# Patient Record
Sex: MALE | Race: WHITE | URBAN - METROPOLITAN AREA
[De-identification: names, ages, dates, MRNs, and addresses within clinical notes are randomized per-mention and may not be internally consistent; named-entity substitution may affect disease eponyms.]

---

## 2017-01-11 NOTE — PROCEDURE NOTE: CLINICAL
PROCEDURE NOTE: Eylea 2mg OD. Diagnosis: Neovascular AMD with Active CNV. Anesthesia: Akten Gel 3.5%. Prep: Betadine Drops. Prior to injection, risks/benefits/alternatives discussed including infection, loss of vision, hemorrhage, cataract, glaucoma, retinal tears or detachment. The patient wished to proceed with treatment. Betadine prep was performed. Topical anesthesia was induced with Alcaine. Additional anesthesia was achieved using drop(s) or injection checked above. A drop of Povidone-iodine 5% ophthalmic solution was instilled over the injection site and in the inferior fornix. Using the syringe provided, Eylea 2.0mg in 0.05 cc was injected into the vitreous cavity. The needle was passed 3.0 mm posterior to the limbus in pseudophakic patients, and 3.5 mm posterior to the limbus in phakic patients. Patient tolerated procedure well. There were no complications. Injection time: *. The eye was irrigated with sterile irrigating solution. Post procedure instructions given. The patient was instructed to return for re-evaluation in approximately 4-12 weeks depending on his/her condition and was told to call immediately if vision decreases and/or if his/her eye becomes red, painful, and/or light sensitive. The patient was instructed to go to the emergency room or call 911 if unable to reach the doctor within an hour or two of trying or calling. The patient was instructed to use Artificial Tears q.i.d. p.r.n for comfort. Ishan Cooper

## 2017-01-11 NOTE — PATIENT DISCUSSION
Based on patient's symptoms, exam, diagnostic testing and records the following are my findings and recommendations.

## 2017-01-11 NOTE — PATIENT DISCUSSION
EYLEA AND REPEAT 6 WKS - MILD TO MODERATE EDEMA AT 8 WKS, HAS BEEN DOING WELL WITH DR Omar Gregg IN [facility] AT 6 WKS

## 2017-03-01 NOTE — PROCEDURE NOTE: CLINICAL
PROCEDURE NOTE: Eylea 2mg OD. Diagnosis: Neovascular AMD with Active CNV. Anesthesia: Akten Gel 3.5%. Prep: Betadine Drops. Prior to injection, risks/benefits/alternatives discussed including infection, loss of vision, hemorrhage, cataract, glaucoma, retinal tears or detachment. The patient wished to proceed with treatment. Betadine prep was performed. Topical anesthesia was induced with Alcaine. Additional anesthesia was achieved using drop(s) or injection checked above. A drop of Povidone-iodine 5% ophthalmic solution was instilled over the injection site and in the inferior fornix. Using the syringe provided, Eylea 2.0mg in 0.05 cc was injected into the vitreous cavity. The needle was passed 3.0 mm posterior to the limbus in pseudophakic patients, and 3.5 mm posterior to the limbus in phakic patients. Patient tolerated procedure well. There were no complications. Injection time: 12:10 PM. The eye was irrigated with sterile irrigating solution. Post procedure instructions given. The patient was instructed to return for re-evaluation in approximately 4-12 weeks depending on his/her condition and was told to call immediately if vision decreases and/or if his/her eye becomes red, painful, and/or light sensitive. The patient was instructed to go to the emergency room or call 911 if unable to reach the doctor within an hour or two of trying or calling. The patient was instructed to use Artificial Tears q.i.d. p.r.n for comfort. Duane Rivera

## 2017-03-01 NOTE — PATIENT DISCUSSION
EYLEA AND REPEAT 6 WKS - MILD TO MODERATE EDEMA AT 8 WKS, HAS BEEN DOING WELL WITH DR Trevor Gresham IN [facility] AT 6 WKS

## 2018-03-01 NOTE — PATIENT DISCUSSION
MARCELO AND RE-EVAL DR Selvin Gallo IN 8 WKS  - TRACE EDEMA AT 8 WKS - HAS BEEN DOING WELL WITH DR Gricelda Edmonds IN [place] AT 6-8 WKS.

## 2018-03-01 NOTE — PROCEDURE NOTE: CLINICAL
PROCEDURE NOTE: Eylea 2mg OD. Diagnosis: Neovascular AMD with Active CNV. Anesthesia: Topical. Prep: Betadine Drops. Prior to injection, risks/benefits/alternatives discussed including infection, loss of vision, hemorrhage, cataract, glaucoma, retinal tears or detachment. The patient wished to proceed with treatment. Betadine prep was performed. Topical anesthesia was induced with Alcaine. Additional anesthesia was achieved using drop(s) or injection checked above. A drop of Povidone-iodine 5% ophthalmic solution was instilled over the injection site and in the inferior fornix. Using the syringe provided, Eylea 2.0mg in 0.05 cc was injected into the vitreous cavity. The remainder of the Eylea in the single-use vial was then discarded in a medical waste disposal container. The needle was passed 3.0 mm posterior to the limbus in pseudophakic patients, and 3.5 mm posterior to the limbus in phakic patients. Patient tolerated procedure well. There were no complications. Injection time: *. The eye was irrigated with sterile irrigating solution. Post procedure instructions given. The patient was instructed to return for re-evaluation in approximately 4-12 weeks depending on his/her condition and was told to call immediately if vision decreases and/or if his/her eye becomes red, painful, and/or light sensitive. The patient was instructed to go to the emergency room or call 911 if unable to reach the doctor within an hour or two of trying or calling. The patient was instructed to use Artificial Tears q.i.d. p.r.n for comfort. Anni Jorgensen

## 2019-10-29 ENCOUNTER — IMPORTED ENCOUNTER (OUTPATIENT)
Dept: URBAN - METROPOLITAN AREA CLINIC 50 | Facility: CLINIC | Age: 84
End: 2019-10-29

## 2020-11-04 ENCOUNTER — IMPORTED ENCOUNTER (OUTPATIENT)
Dept: URBAN - METROPOLITAN AREA CLINIC 50 | Facility: CLINIC | Age: 85
End: 2020-11-04

## 2021-04-17 ASSESSMENT — VISUAL ACUITY
OD_CC: 20/30-
OS_OTHER: 20/40. 20/40.
OS_CC: 20/25
OD_CC: J1
OD_CC: 20/30
OD_OTHER: 20/40. 20/60.
OS_CC: J1+
OD_CC: J1+
OS_BAT: 20/50
OS_OTHER: 20/50. 20/80.
OD_OTHER: 20/50. 20/80.
OS_CC: J1
OD_BAT: 20/40
OD_BAT: 20/50
OS_CC: 20/25-
OS_BAT: 20/40

## 2021-04-17 ASSESSMENT — TONOMETRY
OD_IOP_MMHG: 13
OS_IOP_MMHG: 14
OS_IOP_MMHG: 14
OD_IOP_MMHG: 14

## 2021-12-17 ENCOUNTER — PREPPED CHART (OUTPATIENT)
Dept: URBAN - METROPOLITAN AREA CLINIC 53 | Facility: CLINIC | Age: 86
End: 2021-12-17

## 2021-12-21 ENCOUNTER — COMPREHENSIVE EXAM (OUTPATIENT)
Dept: URBAN - METROPOLITAN AREA CLINIC 53 | Facility: CLINIC | Age: 86
End: 2021-12-21

## 2021-12-21 DIAGNOSIS — H02.834: ICD-10-CM

## 2021-12-21 DIAGNOSIS — H25.13: ICD-10-CM

## 2021-12-21 DIAGNOSIS — H43.811: ICD-10-CM

## 2021-12-21 DIAGNOSIS — H35.363: ICD-10-CM

## 2021-12-21 DIAGNOSIS — H02.831: ICD-10-CM

## 2021-12-21 PROCEDURE — 92014 COMPRE OPH EXAM EST PT 1/>: CPT

## 2021-12-21 PROCEDURE — 92015 DETERMINE REFRACTIVE STATE: CPT

## 2021-12-21 ASSESSMENT — VISUAL ACUITY
OS_CC: 20/30
OD_CC: J1
OD_GLARE: 20/40
OD_PH: 20/30
OS_GLARE: 20/40
OS_CC: J1
OS_GLARE: 20/40
OD_GLARE: 20/40
OD_CC: 20/30

## 2021-12-21 ASSESSMENT — TONOMETRY
OD_IOP_MMHG: 14
OS_IOP_MMHG: 14

## 2021-12-21 NOTE — PATIENT DISCUSSION
Schedule Ptosis VF and external photos to confirm patient meets medical insurance criteria for blepharoplasty.